# Patient Record
Sex: FEMALE | Race: BLACK OR AFRICAN AMERICAN | NOT HISPANIC OR LATINO | Employment: FULL TIME | URBAN - METROPOLITAN AREA
[De-identification: names, ages, dates, MRNs, and addresses within clinical notes are randomized per-mention and may not be internally consistent; named-entity substitution may affect disease eponyms.]

---

## 2017-10-19 ENCOUNTER — APPOINTMENT (EMERGENCY)
Dept: RADIOLOGY | Facility: HOSPITAL | Age: 42
End: 2017-10-19
Payer: COMMERCIAL

## 2017-10-19 ENCOUNTER — HOSPITAL ENCOUNTER (EMERGENCY)
Facility: HOSPITAL | Age: 42
Discharge: HOME | End: 2017-10-19
Admitting: EMERGENCY MEDICINE
Payer: COMMERCIAL

## 2017-10-19 VITALS
HEIGHT: 62 IN | OXYGEN SATURATION: 100 % | HEART RATE: 59 BPM | SYSTOLIC BLOOD PRESSURE: 121 MMHG | WEIGHT: 135 LBS | BODY MASS INDEX: 24.84 KG/M2 | TEMPERATURE: 98.1 F | DIASTOLIC BLOOD PRESSURE: 75 MMHG | RESPIRATION RATE: 18 BRPM

## 2017-10-19 DIAGNOSIS — R22.0 LEFT FACIAL SWELLING: Primary | ICD-10-CM

## 2017-10-19 DIAGNOSIS — M54.2 NECK PAIN ON LEFT SIDE: ICD-10-CM

## 2017-10-19 LAB
ANION GAP SERPL CALCULATED.3IONS-SCNC: 3 MMOL/L (ref 4–13)
ANISOCYTOSIS BLD QL SMEAR: PRESENT
BASOPHILS # BLD AUTO: 0.1 THOUSANDS/ΜL (ref 0–0.1)
BASOPHILS NFR BLD AUTO: 1 % (ref 0–1)
BUN SERPL-MCNC: 14 MG/DL (ref 5–25)
CALCIUM SERPL-MCNC: 9.1 MG/DL (ref 8.3–10.1)
CHLORIDE SERPL-SCNC: 101 MMOL/L (ref 100–108)
CO2 SERPL-SCNC: 26 MMOL/L (ref 21–32)
CREAT SERPL-MCNC: 0.74 MG/DL (ref 0.6–1.3)
EOSINOPHIL # BLD AUTO: 0.1 THOUSAND/ΜL (ref 0–0.61)
EOSINOPHIL NFR BLD AUTO: 1 % (ref 0–6)
ERYTHROCYTE [DISTWIDTH] IN BLOOD BY AUTOMATED COUNT: 17.3 % (ref 11.6–15.1)
GFR SERPL CREATININE-BSD FRML MDRD: 116 ML/MIN/1.73SQ M
GLUCOSE SERPL-MCNC: 84 MG/DL (ref 65–140)
HCT VFR BLD AUTO: 32.4 % (ref 37–47)
HGB BLD-MCNC: 10 G/DL (ref 12–16)
HYPERCHROMIA BLD QL SMEAR: PRESENT
LG PLATELETS BLD QL SMEAR: PRESENT
LYMPHOCYTES # BLD AUTO: 1.8 THOUSANDS/ΜL (ref 0.6–4.47)
LYMPHOCYTES NFR BLD AUTO: 25 % (ref 14–44)
MCH RBC QN AUTO: 25.4 PG (ref 27–31)
MCHC RBC AUTO-ENTMCNC: 31 G/DL (ref 31.4–37.4)
MCV RBC AUTO: 82 FL (ref 82–98)
MONOCYTES # BLD AUTO: 0.7 THOUSAND/ΜL (ref 0.17–1.22)
MONOCYTES NFR BLD AUTO: 9 % (ref 4–12)
NEUTROPHILS # BLD AUTO: 4.6 THOUSANDS/ΜL (ref 1.85–7.62)
NEUTS SEG NFR BLD AUTO: 65 % (ref 43–75)
PLATELET # BLD AUTO: 471 THOUSANDS/UL (ref 130–400)
PLATELET BLD QL SMEAR: ABNORMAL
PMV BLD AUTO: 8.4 FL (ref 8.9–12.7)
POTASSIUM SERPL-SCNC: 4.2 MMOL/L (ref 3.5–5.3)
RBC # BLD AUTO: 3.96 MILLION/UL (ref 4.2–5.4)
SODIUM SERPL-SCNC: 130 MMOL/L (ref 136–145)
WBC # BLD AUTO: 7.3 THOUSAND/UL (ref 4.8–10.8)

## 2017-10-19 PROCEDURE — 96361 HYDRATE IV INFUSION ADD-ON: CPT

## 2017-10-19 PROCEDURE — 85025 COMPLETE CBC W/AUTO DIFF WBC: CPT | Performed by: PHYSICIAN ASSISTANT

## 2017-10-19 PROCEDURE — 70450 CT HEAD/BRAIN W/O DYE: CPT

## 2017-10-19 PROCEDURE — 36415 COLL VENOUS BLD VENIPUNCTURE: CPT | Performed by: PHYSICIAN ASSISTANT

## 2017-10-19 PROCEDURE — 96374 THER/PROPH/DIAG INJ IV PUSH: CPT

## 2017-10-19 PROCEDURE — 99284 EMERGENCY DEPT VISIT MOD MDM: CPT

## 2017-10-19 PROCEDURE — 70491 CT SOFT TISSUE NECK W/DYE: CPT

## 2017-10-19 PROCEDURE — 80048 BASIC METABOLIC PNL TOTAL CA: CPT | Performed by: PHYSICIAN ASSISTANT

## 2017-10-19 RX ORDER — NAPROXEN 500 MG/1
500 TABLET ORAL 2 TIMES DAILY WITH MEALS
Qty: 20 TABLET | Refills: 0 | Status: SHIPPED | OUTPATIENT
Start: 2017-10-19 | End: 2019-04-22

## 2017-10-19 RX ORDER — KETOROLAC TROMETHAMINE 30 MG/ML
15 INJECTION, SOLUTION INTRAMUSCULAR; INTRAVENOUS ONCE
Status: COMPLETED | OUTPATIENT
Start: 2017-10-19 | End: 2017-10-19

## 2017-10-19 RX ADMIN — KETOROLAC TROMETHAMINE 15 MG: 30 INJECTION, SOLUTION INTRAMUSCULAR at 17:23

## 2017-10-19 RX ADMIN — IOHEXOL 100 ML: 350 INJECTION, SOLUTION INTRAVENOUS at 18:12

## 2017-10-19 RX ADMIN — SODIUM CHLORIDE 500 ML: 0.9 INJECTION, SOLUTION INTRAVENOUS at 17:22

## 2017-10-19 NOTE — ED PROVIDER NOTES
History  Chief Complaint   Patient presents with    Facial Swelling     Pt reports she stared with a headache, left earache and neck pain yesterday, today now has left sided facial swelling  The patient is a 59-year-old female who presents to the emergency department complaining of left-sided facial and neck pain and swelling that began suddenly earlier today and has slightly improved now upon presentation  She denies any recent trauma or injury to the area  She states she had similar signs and symptoms approximately 1 month ago that resolved spontaneously after taking Aleve  She denies any dental infections, or history of abscess  She denies fever, chills, nausea, vomiting, fatigue, weakness, change in vision  History provided by:  Patient   used: No        None       Past Medical History:   Diagnosis Date    Seasonal allergies        History reviewed  No pertinent surgical history  History reviewed  No pertinent family history  I have reviewed and agree with the history as documented  Social History   Substance Use Topics    Smoking status: Never Smoker    Smokeless tobacco: Never Used    Alcohol use Yes      Comment: socially        Review of Systems   Constitutional: Negative for chills, diaphoresis, fatigue and fever  HENT: Positive for facial swelling  Negative for hearing loss, sinus pressure and trouble swallowing  Eyes: Negative for photophobia and visual disturbance  Respiratory: Negative for cough, chest tightness, shortness of breath and wheezing  Gastrointestinal: Negative for nausea and vomiting  Genitourinary: Negative  Musculoskeletal: Positive for neck pain (Left side)  Negative for myalgias and neck stiffness  Skin: Negative for color change, pallor and rash  Neurological: Negative for dizziness, weakness, light-headedness, numbness and headaches  Hematological: Negative for adenopathy         Physical Exam  ED Triage Vitals [10/19/17 1601]   Temperature Pulse Respirations Blood Pressure SpO2   98 1 °F (36 7 °C) 60 17 115/79 99 %      Temp Source Heart Rate Source Patient Position - Orthostatic VS BP Location FiO2 (%)   Tympanic Monitor Sitting Right arm --      Pain Score       7           Physical Exam   Constitutional: She is oriented to person, place, and time  She appears well-developed and well-nourished  No distress  HENT:   Head: Normocephalic and atraumatic  Right Ear: External ear normal    Left Ear: External ear normal    Nose: Nose normal    Mouth/Throat: Oropharynx is clear and moist    No significant swelling noted at the left side of the face currently on exam   No elicited TMJ  Eyes: Conjunctivae are normal  Pupils are equal, round, and reactive to light  Right eye exhibits no discharge  Left eye exhibits no discharge  Neck: Normal range of motion  Neck supple  No tracheal deviation present  Cardiovascular: Normal rate, regular rhythm, normal heart sounds and intact distal pulses  Exam reveals no friction rub  No murmur heard  Pulmonary/Chest: Effort normal and breath sounds normal  No respiratory distress  She has no wheezes  Musculoskeletal:        Cervical back: She exhibits pain  Back:    No midline cervical tenderness to palpation, no bony step-offs  Lymphadenopathy:     She has no cervical adenopathy  Neurological: She is alert and oriented to person, place, and time  She exhibits normal muscle tone  Coordination normal    Skin: Skin is warm and dry  Capillary refill takes less than 2 seconds  No rash noted  She is not diaphoretic  No pallor  Nursing note and vitals reviewed        ED Medications  Medications   ketorolac (TORADOL) 30 mg/mL injection 15 mg (15 mg Intravenous Given 10/19/17 1723)   sodium chloride 0 9 % bolus 500 mL (0 mL Intravenous Stopped 10/19/17 1854)   iohexol (OMNIPAQUE) 350 MG/ML injection (MULTI-DOSE) 100 mL (100 mL Intravenous Given 10/19/17 1812) Diagnostic Studies  Labs Reviewed   BASIC METABOLIC PANEL - Abnormal        Result Value Ref Range Status    Sodium 130 (*) 136 - 145 mmol/L Final    Anion Gap 3 (*) 4 - 13 mmol/L Final    Potassium 4 2  3 5 - 5 3 mmol/L Final    Chloride 101  100 - 108 mmol/L Final    CO2 26  21 - 32 mmol/L Final    BUN 14  5 - 25 mg/dL Final    Creatinine 0 74  0 60 - 1 30 mg/dL Final    Comment: Standardized to IDMS reference method    Glucose 84  65 - 140 mg/dL Final    Comment:   If the patient is fasting, the ADA then defines impaired fasting glucose as > 100 mg/dL and diabetes as > or equal to 123 mg/dL  Specimen collection should occur prior to Sulfasalazine administration due to the potential for falsely depressed results  Specimen collection should occur prior to Sulfapyridine administration due to the potential for falsely elevated results  Calcium 9 1  8 3 - 10 1 mg/dL Final    eGFR 116  ml/min/1 73sq m Final    Narrative:     National Kidney Disease Education Program recommendations are as follows:  GFR calculation is accurate only with a steady state creatinine  Chronic Kidney disease less than 60 ml/min/1 73 sq  meters  Kidney failure less than 15 ml/min/1 73 sq  meters     CBC AND DIFFERENTIAL - Abnormal     RBC 3 96 (*) 4 20 - 5 40 Million/uL Final    Hemoglobin 10 0 (*) 12 0 - 16 0 g/dL Final    Hematocrit 32 4 (*) 37 0 - 47 0 % Final    MCH 25 4 (*) 27 0 - 31 0 pg Final    MCHC 31 0 (*) 31 4 - 37 4 g/dL Final    RDW 17 3 (*) 11 6 - 15 1 % Final    MPV 8 4 (*) 8 9 - 12 7 fL Final    Platelets 510 (*) 116 - 400 Thousands/uL Final    WBC 7 30  4 80 - 10 80 Thousand/uL Final    MCV 82  82 - 98 fL Final    Neutrophils Relative 65  43 - 75 % Final    Lymphocytes Relative 25  14 - 44 % Final    Monocytes Relative 9  4 - 12 % Final    Eosinophils Relative 1  0 - 6 % Final    Basophils Relative 1  0 - 1 % Final    Neutrophils Absolute 4 60  1 85 - 7 62 Thousands/µL Final    Lymphocytes Absolute 1 80  0 60 - 4 47 Thousands/µL Final    Monocytes Absolute 0 70  0 17 - 1 22 Thousand/µL Final    Eosinophils Absolute 0 10  0 00 - 0 61 Thousand/µL Final    Basophils Absolute 0 10  0 00 - 0 10 Thousands/µL Final   SMEAR REVIEW(PHLEBS DO NOT ORDER) - Abnormal     Platelet Estimate Increased (*) Adequate Final    Anisocytosis Present   Final    Hypochromia Present   Final    Large Platelet Present   Final       CT soft tissue neck with contrast   Final Result      No significant abnormality identified  Workstation performed: [id number]         CT head without contrast   Final Result      No acute intracranial abnormality  Workstation performed: [id number]             Procedures  Procedures      Phone Contacts  ED Phone Contact    ED Course  ED Course                                MDM  Number of Diagnoses or Management Options  Left facial swelling: new and requires workup  Neck pain on left side: new and requires workup  Diagnosis management comments: Patient's lab results were unremarkable for acute findings that correlate with her symptoms  CT soft tissue neck and CT head were negative for acute findings  The patient was discharged in no acute distress with supportive therapy and referred to Dr Marie Espinoza to establish care and for follow-up evaluation  Amount and/or Complexity of Data Reviewed  Clinical lab tests: ordered and reviewed  Tests in the radiology section of CPT®: ordered and reviewed  Review and summarize past medical records: yes      CritCare Time    Disposition  Final diagnoses:   Left facial swelling   Neck pain on left side     ED Disposition     ED Disposition Condition Comment    Discharge  Abhijeet Prabhakar discharge to home/self care      Condition at discharge: Stable        Follow-up Information     Follow up With Specialties Details Why [address] [facility] Emergency Department Emergency Medicine Go to If symptoms worsen Juliette Dobbs Rojas Cancino [address] [facility] ED, [place], [place], [place], [address], MD Internal Medicine Schedule an appointment as soon as possible for a visit To establish care and for follow-up evaluation [address] [place] [address]  [phone]           Discharge Medication List as of 10/19/2017  6:44 PM      START taking these medications    Details   naproxen (EC NAPROSYN) 500 MG EC tablet Take 1 tablet by mouth 2 (two) times a day with meals for 10 days, Starting Thu 10/19/2017, Until Sun 10/29/2017, Print           No discharge procedures on file      ED Provider  Electronically Signed by       Ying Lin PA-C  10/19/17 4554

## 2019-04-22 ENCOUNTER — OFFICE VISIT (OUTPATIENT)
Dept: OBGYN CLINIC | Facility: CLINIC | Age: 44
End: 2019-04-22
Payer: COMMERCIAL

## 2019-04-22 ENCOUNTER — APPOINTMENT (OUTPATIENT)
Dept: RADIOLOGY | Facility: CLINIC | Age: 44
End: 2019-04-22
Payer: COMMERCIAL

## 2019-04-22 VITALS
SYSTOLIC BLOOD PRESSURE: 145 MMHG | HEIGHT: 62 IN | HEART RATE: 64 BPM | WEIGHT: 135 LBS | DIASTOLIC BLOOD PRESSURE: 82 MMHG | BODY MASS INDEX: 24.84 KG/M2

## 2019-04-22 DIAGNOSIS — M79.641 HAND PAIN, RIGHT: ICD-10-CM

## 2019-04-22 DIAGNOSIS — R22.31 MASS OF RIGHT HAND: Primary | ICD-10-CM

## 2019-04-22 DIAGNOSIS — M67.441 GANGLION CYST OF FINGER OF RIGHT HAND: ICD-10-CM

## 2019-04-22 PROCEDURE — 73130 X-RAY EXAM OF HAND: CPT

## 2019-04-22 PROCEDURE — 99204 OFFICE O/P NEW MOD 45 MIN: CPT | Performed by: ORTHOPAEDIC SURGERY

## 2019-04-22 RX ORDER — CEFAZOLIN SODIUM 1 G/50ML
1000 SOLUTION INTRAVENOUS ONCE
Status: CANCELLED | OUTPATIENT
Start: 2019-04-22 | End: 2019-04-22

## 2019-04-26 ENCOUNTER — TELEPHONE (OUTPATIENT)
Dept: OBGYN CLINIC | Facility: CLINIC | Age: 44
End: 2019-04-26

## 2022-05-22 ENCOUNTER — APPOINTMENT (EMERGENCY)
Dept: RADIOLOGY | Facility: HOSPITAL | Age: 47
End: 2022-05-22
Payer: COMMERCIAL

## 2022-05-22 ENCOUNTER — HOSPITAL ENCOUNTER (EMERGENCY)
Facility: HOSPITAL | Age: 47
Discharge: HOME | End: 2022-05-22
Attending: EMERGENCY MEDICINE | Admitting: EMERGENCY MEDICINE
Payer: COMMERCIAL

## 2022-05-22 VITALS
DIASTOLIC BLOOD PRESSURE: 80 MMHG | OXYGEN SATURATION: 100 % | WEIGHT: 141.6 LBS | BODY MASS INDEX: 25.9 KG/M2 | TEMPERATURE: 97.7 F | SYSTOLIC BLOOD PRESSURE: 147 MMHG | RESPIRATION RATE: 20 BRPM | HEART RATE: 69 BPM

## 2022-05-22 DIAGNOSIS — U07.1 COVID: Primary | ICD-10-CM

## 2022-05-22 LAB
FLUAV RNA RESP QL NAA+PROBE: NEGATIVE
FLUBV RNA RESP QL NAA+PROBE: NEGATIVE
RSV RNA RESP QL NAA+PROBE: NEGATIVE
SARS-COV-2 RNA RESP QL NAA+PROBE: POSITIVE

## 2022-05-22 PROCEDURE — 0241U HB NFCT DS VIR RESP RNA 4 TRGT: CPT | Performed by: EMERGENCY MEDICINE

## 2022-05-22 PROCEDURE — 99284 EMERGENCY DEPT VISIT MOD MDM: CPT

## 2022-05-22 PROCEDURE — 99284 EMERGENCY DEPT VISIT MOD MDM: CPT | Performed by: EMERGENCY MEDICINE

## 2022-05-22 PROCEDURE — 71045 X-RAY EXAM CHEST 1 VIEW: CPT

## 2022-05-22 NOTE — Clinical Note
Isaac Blackwood was seen and treated in our emergency department on 5/22/2022  Diagnosis:     Geri Souza  may return to work on return date  She may return on this date: 05/29/2022         If you have any questions or concerns, please don't hesitate to call        Rodríguez Caballero, DO    ______________________________           _______________          _______________  Hospital Representative                              Date                                Time

## 2022-05-23 NOTE — ED NOTES
X-Ray at [facility], [facility]  05/22/22 2026

## 2022-05-23 NOTE — ED PROVIDER NOTES
History  Chief Complaint   Patient presents with    Cold Like Symptoms     States she started on 5/19 with a cough, runny nose , chills and now has back pain   Did not do any home covid test       67-year-old female presents stating 3 days ago she started with cough runny nose chills now has some back pain and a cough  Denies any fevers or myalgias  No other complaints  She is concerned about having pneumonia  Patient was vaccinated but no booster for COVID  History provided by:  Patient   used: No        None       Past Medical History:   Diagnosis Date    Seasonal allergies        History reviewed  No pertinent surgical history  Family History   Problem Relation Age of Onset    Diabetes Father     Collagen disease Paternal Grandmother      I have reviewed and agree with the history as documented  E-Cigarette/Vaping    E-Cigarette Use Never User      E-Cigarette/Vaping Substances     Social History     Tobacco Use    Smoking status: Never Smoker    Smokeless tobacco: Never Used   Vaping Use    Vaping Use: Never used   Substance Use Topics    Alcohol use: Yes     Comment: socially    Drug use: No       Review of Systems   Constitutional: Positive for chills  Negative for activity change, diaphoresis and fever  HENT: Negative for congestion, ear pain, nosebleeds, sore throat, trouble swallowing and voice change  Eyes: Negative for pain, discharge and redness  Respiratory: Positive for cough  Negative for apnea, choking, shortness of breath, wheezing and stridor  Cardiovascular: Negative for chest pain and palpitations  Gastrointestinal: Negative for abdominal distention, abdominal pain, constipation, diarrhea, nausea and vomiting  Endocrine: Negative for polydipsia  Genitourinary: Negative for difficulty urinating, dysuria, flank pain, frequency, hematuria and urgency     Musculoskeletal: Negative for back pain, gait problem, joint swelling, myalgias, neck pain and neck stiffness  Skin: Negative for pallor and rash  Neurological: Negative for dizziness, tremors, syncope, speech difficulty, weakness, numbness and headaches  Hematological: Negative for adenopathy  Psychiatric/Behavioral: Negative for confusion, hallucinations, self-injury and suicidal ideas  The patient is not nervous/anxious  Physical Exam  Physical Exam  Vitals and nursing note reviewed  Constitutional:       General: She is not in acute distress  Appearance: She is well-developed  She is not diaphoretic  HENT:      Head: Normocephalic and atraumatic  Right Ear: External ear normal       Left Ear: External ear normal       Nose: Nose normal    Eyes:      Conjunctiva/sclera: Conjunctivae normal       Pupils: Pupils are equal, round, and reactive to light  Cardiovascular:      Rate and Rhythm: Normal rate and regular rhythm  Heart sounds: Normal heart sounds  Pulmonary:      Effort: Pulmonary effort is normal       Breath sounds: Normal breath sounds  Abdominal:      General: Bowel sounds are normal       Palpations: Abdomen is soft  Musculoskeletal:         General: Normal range of motion  Cervical back: Normal range of motion and neck supple  Skin:     General: Skin is warm and dry  Neurological:      Mental Status: She is alert and oriented to person, place, and time  Deep Tendon Reflexes: Reflexes are normal and symmetric  Psychiatric:         Behavior: Behavior is cooperative           Vital Signs  ED Triage Vitals [05/22/22 1846]   Temperature Pulse Respirations Blood Pressure SpO2   97 7 °F (36 5 °C) 69 20 147/80 100 %      Temp Source Heart Rate Source Patient Position - Orthostatic VS BP Location FiO2 (%)   Tympanic Monitor Sitting Left arm --      Pain Score       7           Vitals:    05/22/22 1846   BP: 147/80   Pulse: 69   Patient Position - Orthostatic VS: Sitting         Visual Acuity      ED Medications  Medications - No data to display    Diagnostic Studies  Results Reviewed     Procedure Component Value Units Date/Time    COVID/FLU/RSV - 2 hour TAT [[id number]]  (Abnormal) Collected: 05/22/22 2029    Lab Status: Final result Specimen: Nares from Nasopharyngeal Swab Updated: 05/22/22 2122     SARS-CoV-2 Positive     INFLUENZA A PCR Negative     INFLUENZA B PCR Negative     RSV PCR Negative    Narrative:      FOR PEDIATRIC PATIENTS - copy/paste COVID Guidelines URL to browser: [email or web address]  [employer]    SARS-CoV-2 assay is a Nucleic Acid Amplification assay intended for the  qualitative detection of nucleic acid from SARS-CoV-2 in nasopharyngeal  swabs  Results are for the presumptive identification of SARS-CoV-2 RNA  Positive results are indicative of infection with SARS-CoV-2, the virus  causing COVID-19, but do not rule out bacterial infection or co-infection  with other viruses  Laboratories within the [place] and its  territories are required to report all positive results to the appropriate  public health authorities  Negative results do not preclude SARS-CoV-2  infection and should not be used as the sole basis for treatment or other  patient management decisions  Negative results must be combined with  clinical observations, patient history, and epidemiological information  This test has not been FDA cleared or approved  This test has been authorized by FDA under an Emergency Use Authorization  (EUA)  This test is only authorized for the duration of time the  declaration that circumstances exist justifying the authorization of the  emergency use of an in vitro diagnostic tests for detection of SARS-CoV-2  virus and/or diagnosis of COVID-19 infection under section 564(b)(1) of  the Act, 21 U  S C  132OCD-1(Z)(1), unless the authorization is terminated  or revoked sooner  The test has been validated but independent review by FDA  and CLIA is pending      Test performed using [employer] GeneXpert: This RT-PCR assay targets N2,  a region unique to SARS-CoV-2  A conserved region in the E-gene was chosen  for pan-Sarbecovirus detection which includes SARS-CoV-2  XR chest 1 view portable    (Results Pending)              Procedures  Procedures         ED Course                               SBIRT 20yo+    Flowsheet Row Most Recent Value   SBIRT (23 yo +)    In order to provide better care to our patients, we are screening all of our patients for alcohol and drug use  Would it be okay to ask you these screening questions? Yes Filed at: 05/22/2022 2029   Initial Alcohol Screen: US AUDIT-C     1  How often do you have a drink containing alcohol? 3 Filed at: 05/22/2022 2029   2  How many drinks containing alcohol do you have on a typical day you are drinking? 1 Filed at: 05/22/2022 2029   3a  Male UNDER 65: How often do you have five or more drinks on one occasion? 0 Filed at: 05/22/2022 2029   3b  FEMALE Any Age, or MALE 65+: How often do you have 4 or more drinks on one occassion? 0 Filed at: 05/22/2022 2029   Audit-C Score 4 Filed at: 05/22/2022 2029   LANEY: How many times in the past year have you    Used an illegal drug or used a prescription medication for non-medical reasons? Never Filed at: 05/22/2022 2029                    MDM    Disposition  Final diagnoses:   COVID     Time reflects when diagnosis was documented in both MDM as applicable and the Disposition within this note     Time User Action Codes Description Comment    5/22/2022  9:30 PM Lita Michaels Add [U07 1] Erik       ED Disposition     ED Disposition   Discharge    Condition   Stable    Date/Time   Sun May 22, 2022  9:29 PM    Comment   Ingrid Jarrell discharge to home/self care                 Follow-up Information     Follow up With Specialties Details Why Contact Info Additional Information    [address]  Emergency Department Emergency Medicine  As needed [address] [facility] [address]  [address] [address] Emergency Department, [place], [place], [address]          There are no discharge medications for this patient  No discharge procedures on file      PDMP Review     None          ED Provider  Electronically Signed by           Tristin Carr DO  05/22/22 7060